# Patient Record
Sex: FEMALE | Race: NATIVE HAWAIIAN OR OTHER PACIFIC ISLANDER | NOT HISPANIC OR LATINO | URBAN - METROPOLITAN AREA
[De-identification: names, ages, dates, MRNs, and addresses within clinical notes are randomized per-mention and may not be internally consistent; named-entity substitution may affect disease eponyms.]

---

## 2024-04-08 ENCOUNTER — EMERGENCY (EMERGENCY)
Facility: HOSPITAL | Age: 64
LOS: 1 days | Discharge: HOME | End: 2024-04-08
Attending: STUDENT IN AN ORGANIZED HEALTH CARE EDUCATION/TRAINING PROGRAM
Payer: COMMERCIAL

## 2024-04-08 VITALS
HEART RATE: 85 BPM | DIASTOLIC BLOOD PRESSURE: 86 MMHG | RESPIRATION RATE: 20 BRPM | HEIGHT: 65 IN | OXYGEN SATURATION: 99 % | SYSTOLIC BLOOD PRESSURE: 143 MMHG | TEMPERATURE: 98 F | WEIGHT: 145.06 LBS

## 2024-04-08 PROCEDURE — 29125 APPL SHORT ARM SPLINT STATIC: CPT | Mod: LT

## 2024-04-08 PROCEDURE — 99285 EMERGENCY DEPT VISIT HI MDM: CPT | Mod: 25

## 2024-04-09 VITALS
OXYGEN SATURATION: 98 % | TEMPERATURE: 98 F | HEART RATE: 77 BPM | SYSTOLIC BLOOD PRESSURE: 109 MMHG | RESPIRATION RATE: 18 BRPM | DIASTOLIC BLOOD PRESSURE: 77 MMHG

## 2024-04-09 LAB
ALBUMIN SERPL ELPH-MCNC: 4.4 G/DL — SIGNIFICANT CHANGE UP (ref 3.3–5)
ALP SERPL-CCNC: 85 U/L — SIGNIFICANT CHANGE UP (ref 40–120)
ALT FLD-CCNC: 16 U/L — SIGNIFICANT CHANGE UP (ref 10–45)
ANION GAP SERPL CALC-SCNC: 13 MMOL/L — SIGNIFICANT CHANGE UP (ref 5–17)
AST SERPL-CCNC: 16 U/L — SIGNIFICANT CHANGE UP (ref 10–40)
BASOPHILS # BLD AUTO: 0.03 K/UL — SIGNIFICANT CHANGE UP (ref 0–0.2)
BASOPHILS NFR BLD AUTO: 0.3 % — SIGNIFICANT CHANGE UP (ref 0–2)
BILIRUB SERPL-MCNC: 0.2 MG/DL — SIGNIFICANT CHANGE UP (ref 0.2–1.2)
BUN SERPL-MCNC: 20 MG/DL — SIGNIFICANT CHANGE UP (ref 7–23)
CALCIUM SERPL-MCNC: 9.8 MG/DL — SIGNIFICANT CHANGE UP (ref 8.4–10.5)
CHLORIDE SERPL-SCNC: 105 MMOL/L — SIGNIFICANT CHANGE UP (ref 96–108)
CO2 SERPL-SCNC: 24 MMOL/L — SIGNIFICANT CHANGE UP (ref 22–31)
CREAT SERPL-MCNC: 0.65 MG/DL — SIGNIFICANT CHANGE UP (ref 0.5–1.3)
CRP SERPL-MCNC: 8 MG/L — HIGH (ref 0–4)
EGFR: 98 ML/MIN/1.73M2 — SIGNIFICANT CHANGE UP
EOSINOPHIL # BLD AUTO: 0.14 K/UL — SIGNIFICANT CHANGE UP (ref 0–0.5)
EOSINOPHIL NFR BLD AUTO: 1.4 % — SIGNIFICANT CHANGE UP (ref 0–6)
ERYTHROCYTE [SEDIMENTATION RATE] IN BLOOD: 29 MM/HR — HIGH (ref 0–20)
GLUCOSE SERPL-MCNC: 88 MG/DL — SIGNIFICANT CHANGE UP (ref 70–99)
HCT VFR BLD CALC: 36.9 % — SIGNIFICANT CHANGE UP (ref 34.5–45)
HGB BLD-MCNC: 12.3 G/DL — SIGNIFICANT CHANGE UP (ref 11.5–15.5)
IMM GRANULOCYTES NFR BLD AUTO: 0.2 % — SIGNIFICANT CHANGE UP (ref 0–0.9)
LYMPHOCYTES # BLD AUTO: 3.98 K/UL — HIGH (ref 1–3.3)
LYMPHOCYTES # BLD AUTO: 39.3 % — SIGNIFICANT CHANGE UP (ref 13–44)
MCHC RBC-ENTMCNC: 32.5 PG — SIGNIFICANT CHANGE UP (ref 27–34)
MCHC RBC-ENTMCNC: 33.3 GM/DL — SIGNIFICANT CHANGE UP (ref 32–36)
MCV RBC AUTO: 97.6 FL — SIGNIFICANT CHANGE UP (ref 80–100)
MONOCYTES # BLD AUTO: 0.76 K/UL — SIGNIFICANT CHANGE UP (ref 0–0.9)
MONOCYTES NFR BLD AUTO: 7.5 % — SIGNIFICANT CHANGE UP (ref 2–14)
NEUTROPHILS # BLD AUTO: 5.2 K/UL — SIGNIFICANT CHANGE UP (ref 1.8–7.4)
NEUTROPHILS NFR BLD AUTO: 51.3 % — SIGNIFICANT CHANGE UP (ref 43–77)
NRBC # BLD: 0 /100 WBCS — SIGNIFICANT CHANGE UP (ref 0–0)
PLATELET # BLD AUTO: 170 K/UL — SIGNIFICANT CHANGE UP (ref 150–400)
POTASSIUM SERPL-MCNC: 4.5 MMOL/L — SIGNIFICANT CHANGE UP (ref 3.5–5.3)
POTASSIUM SERPL-SCNC: 4.5 MMOL/L — SIGNIFICANT CHANGE UP (ref 3.5–5.3)
PROT SERPL-MCNC: 7.3 G/DL — SIGNIFICANT CHANGE UP (ref 6–8.3)
RBC # BLD: 3.78 M/UL — LOW (ref 3.8–5.2)
RBC # FLD: 12.9 % — SIGNIFICANT CHANGE UP (ref 10.3–14.5)
SODIUM SERPL-SCNC: 142 MMOL/L — SIGNIFICANT CHANGE UP (ref 135–145)
WBC # BLD: 10.13 K/UL — SIGNIFICANT CHANGE UP (ref 3.8–10.5)
WBC # FLD AUTO: 10.13 K/UL — SIGNIFICANT CHANGE UP (ref 3.8–10.5)

## 2024-04-09 PROCEDURE — 85025 COMPLETE CBC W/AUTO DIFF WBC: CPT

## 2024-04-09 PROCEDURE — 85652 RBC SED RATE AUTOMATED: CPT

## 2024-04-09 PROCEDURE — 96374 THER/PROPH/DIAG INJ IV PUSH: CPT | Mod: XU

## 2024-04-09 PROCEDURE — 86140 C-REACTIVE PROTEIN: CPT

## 2024-04-09 PROCEDURE — 73130 X-RAY EXAM OF HAND: CPT

## 2024-04-09 PROCEDURE — 73110 X-RAY EXAM OF WRIST: CPT | Mod: 26,LT

## 2024-04-09 PROCEDURE — 73201 CT UPPER EXTREMITY W/DYE: CPT | Mod: 26,LT,MC

## 2024-04-09 PROCEDURE — 73201 CT UPPER EXTREMITY W/DYE: CPT | Mod: MC

## 2024-04-09 PROCEDURE — 99284 EMERGENCY DEPT VISIT MOD MDM: CPT | Mod: 25

## 2024-04-09 PROCEDURE — 80053 COMPREHEN METABOLIC PANEL: CPT

## 2024-04-09 PROCEDURE — 73110 X-RAY EXAM OF WRIST: CPT

## 2024-04-09 PROCEDURE — 73130 X-RAY EXAM OF HAND: CPT | Mod: 26,LT

## 2024-04-09 PROCEDURE — 96375 TX/PRO/DX INJ NEW DRUG ADDON: CPT | Mod: XU

## 2024-04-09 RX ORDER — KETOROLAC TROMETHAMINE 30 MG/ML
15 SYRINGE (ML) INJECTION ONCE
Refills: 0 | Status: DISCONTINUED | OUTPATIENT
Start: 2024-04-09 | End: 2024-04-09

## 2024-04-09 RX ORDER — CEPHALEXIN 500 MG
1 CAPSULE ORAL
Qty: 20 | Refills: 0
Start: 2024-04-09 | End: 2024-04-13

## 2024-04-09 RX ORDER — VANCOMYCIN HCL 1 G
1000 VIAL (EA) INTRAVENOUS ONCE
Refills: 0 | Status: COMPLETED | OUTPATIENT
Start: 2024-04-09 | End: 2024-04-09

## 2024-04-09 RX ORDER — ACETAMINOPHEN 500 MG
1000 TABLET ORAL ONCE
Refills: 0 | Status: DISCONTINUED | OUTPATIENT
Start: 2024-04-09 | End: 2024-04-09

## 2024-04-09 RX ORDER — SODIUM CHLORIDE 9 MG/ML
1000 INJECTION INTRAMUSCULAR; INTRAVENOUS; SUBCUTANEOUS ONCE
Refills: 0 | Status: COMPLETED | OUTPATIENT
Start: 2024-04-09 | End: 2024-04-09

## 2024-04-09 RX ADMIN — Medication 15 MILLIGRAM(S): at 01:36

## 2024-04-09 RX ADMIN — Medication 250 MILLIGRAM(S): at 03:25

## 2024-04-09 RX ADMIN — SODIUM CHLORIDE 1000 MILLILITER(S): 9 INJECTION INTRAMUSCULAR; INTRAVENOUS; SUBCUTANEOUS at 01:54

## 2024-04-09 NOTE — ED ADULT NURSE NOTE - NSFALLUNIVINTERV_ED_ALL_ED
Bed/Stretcher in lowest position, wheels locked, appropriate side rails in place/Call bell, personal items and telephone in reach/Instruct patient to call for assistance before getting out of bed/chair/stretcher/Non-slip footwear applied when patient is off stretcher/[place] to call system/Physically safe environment - no spills, clutter or unnecessary equipment/Purposeful proactive rounding/Room/bathroom lighting operational, light cord in reach

## 2024-04-09 NOTE — ED ADULT NURSE NOTE - OBJECTIVE STATEMENT
62 y/O F with no pertinent medical history presenting to the ED for left hand pain/redness, atraumatic x 1-2 days.  Patient was otherwise in normal state of health.  Spontaneously noted progressive swelling over the dorsal aspect of her left hand w/ swelling.  Denies associated fever/chills.  Denies any puncture or traumatic wounds.  No history of gout.  Denies any history of immunosuppression medications.    Patient was seen at urgent care who initially had prescribed doxycycline and ibuprofen but ultimately recommended she come to the emergency department for further evaluation concerning for infectious process. AOx4, MAEx4, respirations even and unlabored, abd soft nontender/nondistended, skin warm dry and normal for race.     	Hx per son at bedside, offered  services but declining at this time.

## 2024-04-09 NOTE — ED PROVIDER NOTE - PHYSICAL EXAMINATION
GEN: Patient awake and alert. No acute distress, non-toxic. Uncomfortable secondary to pain.   Head: Normocephalic, atraumatic.  Neck: Nontender, full ROM.   Eyes: PERRLA b/l. EOMI, no scleral icterus, no conjunctival injection. Moist mucous membranes.  CARDIAC: RRR. Normal S1, S2. No murmur, rubs, or gallops. No peripheral edema noted.  PULM: Speaking in full sentences. CTA B/L no wheeze, rales or rhonchi. No signs of respiratory distress, no accessory muscle usage or nasal flaring.  ABD: Soft, nontender, nondistended. No rebound, no involuntary guarding. BS x 4, no auscultated bruit. No HSM appreciated.  : No CVA tenderness, no suprapubic tenderness.  MSK: Unable to actively range left wrist, erythematous over dorsal-radial aspect with marked TTP, no crepitus, no erythema linear streaking. Moving all other extremities spontaneously. Full ROM in extremities. No obvious bony deformity.  NEURO: A&Ox3, no focal neurological deficits, CN 2-12 grossly intact.   SKIN: Warm, dry, no rash,  no open wounds. No urticaria. No jaundice.

## 2024-04-09 NOTE — ED PROVIDER NOTE - OBJECTIVE STATEMENT
63-year-old female with no pertinent medical history presenting to the emergency department for acute onset x 1 to 2-day of left hand pain/redness, atraumatic.  Patient was otherwise in normal state of health.  Spontaneously noted progressive swelling over the dorsal aspect of her left hand w/ swelling.  Denies associated fever/chills.  Denies any puncture or traumatic wounds.  No history of gout.  Denies any history of immunosuppression medications.  No IVDU.  Patient was seen at urgent care who initially had prescribed doxycycline and ibuprofen but ultimately recommended she come to the emergency department for further evaluation concerning for infectious process.  Denies any purulent drainage or erythematous streaking up her arm.  No aquatic exposure.    Hx per son at bedside, offered  services but declining at this time.

## 2024-04-09 NOTE — ED ADULT NURSE REASSESSMENT NOTE - NS ED NURSE REASSESS COMMENT FT1
0700 Report received from CESAR Burgos  Pt AAOx4, NAD, resp nonlabored, skin warm/dry, resting comfortably in bed . Pt denies headache, dizziness, chest pain, palpitations, SOB, fevers, chills. Pt awaiting for results  . Safety maintained .

## 2024-04-09 NOTE — ED PROVIDER NOTE - NSFOLLOWUPINSTRUCTIONS_ED_ALL_ED_FT
You were seen in the emergency department for pain of the left wrist.  Your x-rays not show a fracture or dislocation.  The CT did show inflammatory changes in the area of your hand you have pain.  Given these findings you likely have inflammation of the tendon in that area.  We have contacted the hand surgeon who recommends you to use warm compresses and NSAIDs for pain control as well as taking an antibiotic which is sent to your pharmacy.  Please  this antibiotic as soon as possible and begin taking.      Please call Dr. Leon Resendez and schedule an appointment in the next 2-3 days for further management of your hand.  If you develop fevers, chills, worsening pain of your wrist, increased redness down your arm very elevated discharge left hand pain, or for any other questions or concerns please return to the emergency department.          Tenosynovitis  Hand and wrist showing the tendons and the tendon sheath.  Tenosynovitis is inflammation of a tendon and of the sleeve of tissue that covers the tendon (tendon sheath). A tendon is a cord of tissue that connects muscle to bone. Normally, a tendon slides smoothly inside its tendon sheath. Tenosynovitis limits movement of the tendon and surrounding tissues, which may cause pain, swelling, and stiffness.    Tenosynovitis can affect any tendon and tendon sheath. Commonly affected areas include tendons in the:  Wrist.  Arm.  Hand.  Hip.  Leg.  Foot.  Shoulder.  What are the causes?  The main cause of this condition is wear and tear over time that results in slight tears in the tendon. Other possible causes include:  An injury to the tendon or tendon sheath.  A disease that causes inflammation in the body.  An infection that spreads to the tendon and tendon sheath from a skin wound.  An infection in another part of the body that spreads to the tendon and tendon sheath through the blood.  What increases the risk?  The following factors may make you more likely to develop this condition:  Having rheumatoid arthritis, gout, or diabetes.  Using IV drugs.  Doing physical activities that can cause tendon overuse and stress.  Having gonorrhea.  What are the signs or symptoms?  Symptoms of this condition depend on the cause. Symptoms may include:  Pain with movement.  Pain when pressing on the tendon and tendon sheath.  Swelling.  Stiffness.  If tenosynovitis is caused by an infection, additional symptoms may include:  Fever.  Redness.  Warmth.  How is this diagnosed?  This condition may be diagnosed based on your medical history and a physical exam. You also may have:  Blood tests.  Imaging tests, such as:  MRI.  Ultrasound.  A sample of fluid removed from inside the tendon sheath to be checked in a lab.  How is this treated?  Treatment for this condition depends on the cause. If tenosynovitis is not caused by an infection, treatment may include:  Rest.  Keeping the tendon in place (immobilization) in a splint, brace, or sling.  Taking NSAIDs, such as ibuprofen, to reduce pain and swelling.  A shot (injection) of a steroid medicine to help reduce pain and swelling.  Icing or applying heat to the affected area.  Physical or occupational therapy.  Surgery to release the tendon in the sheath, to release the area that the tendon rests in, or to repair damage to the tendon or tendon sheath. Surgery may be done if other treatments do not help relieve symptoms.  If tenosynovitis is caused by infection, treatment may include antibiotic medicine given through an IV. In most cases, surgery may be needed to drain fluid from the tendon sheath, to clean and rinse out (irrigate) the tendon sheath, or to remove the tendon sheath.    Follow these instructions at home:  If you have a removable splint, brace, or sling:    A splint on a person's hand and wrist.   Wear the splint, brace, or sling as told by your health care provider. Remove it only as told by your health care provider.  Check the skin around the splint, brace, or sling every day. Tell your health care provider about any concerns.  Loosen the splint, brace, or sling if your fingers or toes tingle, become numb, or turn cold and blue.  Keep the splint, brace, or sling clean.  If the splint, brace, or sling is not waterproof:  Do not let it get wet.  Cover it with a watertight covering when you take a bath or shower.  Managing pain, stiffness, and swelling    Bag of ice on a towel on the skin.   If directed, put ice on the affected area. To do this:  If you have a removable splint, brace, or sling, remove it as told by your health care provider.  Put ice in a plastic bag.  Place a towel between your skin and the bag.  Leave the ice on for 20 minutes, 2–3 times a day.  Remove the ice if your skin turns bright red. This is very important. If you cannot feel pain, heat, or cold, you have a greater risk of damage to the area.  Move the fingers or toes of the affected limb often, if this applies. This can help to reduce stiffness and swelling.  If directed, raise (elevate) the affected area above the level of your heart while you are sitting or lying down.  If directed, apply heat to the affected area before you exercise or do therapy. Use the heat source that your health care provider recommends, such as a moist heat pack or a heating pad.  Place a towel between your skin and the heat source.  Leave the heat on for 20–30 minutes.  Remove the heat if your skin turns bright red. This is especially important if you are unable to feel pain, heat, or cold. You have a greater risk of getting burned.  Activity    Return to your normal activities as told by your health care provider. Ask your health care provider what activities are safe for you.  Rest the affected area as told by your health care provider.  Avoid using the affected area while you are having symptoms.  Do not use the injured limb to support your body weight until your health care provider says that you can.  If physical therapy was prescribed, do exercises as told by your health care provider.  General instructions    Take over-the-counter and prescription medicines only as told by your health care provider.  Ask your health care provider when it is safe to drive if you have a splint, brace, or sling on any part of your arm or leg.  Keep all follow-up visits. This is important.  Contact a health care provider if:  Your symptoms are not improving or are getting worse.  You have a fever and worsening of any of the following symptoms:  Pain.  Redness.  Warmth.  Swelling.  Get help right away if:  Your fingers or toes become numb or turn blue.  Summary  Tenosynovitis is inflammation of a tendon and of the sleeve of tissue that covers the tendon (tendon sheath).  Treatment for this condition depends on the cause. Treatment may include rest, medicines, physical therapy, or surgery.  Contact a health care provider if your symptoms are not improving or are getting worse.  Keep all follow-up visits.  This information is not intended to replace advice given to you by your health care provider. Make sure you discuss any questions you have with your health care provider. You were seen in the emergency department for pain of the left wrist.  Your x-rays not show a fracture or dislocation.  The CT did show inflammatory changes in the area of your hand you have pain.  Given these findings you likely have inflammation of the tendon in that area.  We have contacted the hand surgeon who recommends you to use warm compresses and NSAIDs for pain control as well as taking an antibiotic: Keflex which is sent to your pharmacy.  Please  this antibiotic 4 times per day for the next 5 days.     Please call Dr. Leon Resendez and schedule an appointment in the next 2-3 days for further management of your hand.  If you develop fevers, chills, worsening pain of your wrist, increased redness down your arm very elevated discharge left hand pain, or for any other questions or concerns please return to the emergency department.          Tenosynovitis  Hand and wrist showing the tendons and the tendon sheath.  Tenosynovitis is inflammation of a tendon and of the sleeve of tissue that covers the tendon (tendon sheath). A tendon is a cord of tissue that connects muscle to bone. Normally, a tendon slides smoothly inside its tendon sheath. Tenosynovitis limits movement of the tendon and surrounding tissues, which may cause pain, swelling, and stiffness.    Tenosynovitis can affect any tendon and tendon sheath. Commonly affected areas include tendons in the:  Wrist.  Arm.  Hand.  Hip.  Leg.  Foot.  Shoulder.  What are the causes?  The main cause of this condition is wear and tear over time that results in slight tears in the tendon. Other possible causes include:  An injury to the tendon or tendon sheath.  A disease that causes inflammation in the body.  An infection that spreads to the tendon and tendon sheath from a skin wound.  An infection in another part of the body that spreads to the tendon and tendon sheath through the blood.  What increases the risk?  The following factors may make you more likely to develop this condition:  Having rheumatoid arthritis, gout, or diabetes.  Using IV drugs.  Doing physical activities that can cause tendon overuse and stress.  Having gonorrhea.  What are the signs or symptoms?  Symptoms of this condition depend on the cause. Symptoms may include:  Pain with movement.  Pain when pressing on the tendon and tendon sheath.  Swelling.  Stiffness.  If tenosynovitis is caused by an infection, additional symptoms may include:  Fever.  Redness.  Warmth.  How is this diagnosed?  This condition may be diagnosed based on your medical history and a physical exam. You also may have:  Blood tests.  Imaging tests, such as:  MRI.  Ultrasound.  A sample of fluid removed from inside the tendon sheath to be checked in a lab.  How is this treated?  Treatment for this condition depends on the cause. If tenosynovitis is not caused by an infection, treatment may include:  Rest.  Keeping the tendon in place (immobilization) in a splint, brace, or sling.  Taking NSAIDs, such as ibuprofen, to reduce pain and swelling.  A shot (injection) of a steroid medicine to help reduce pain and swelling.  Icing or applying heat to the affected area.  Physical or occupational therapy.  Surgery to release the tendon in the sheath, to release the area that the tendon rests in, or to repair damage to the tendon or tendon sheath. Surgery may be done if other treatments do not help relieve symptoms.  If tenosynovitis is caused by infection, treatment may include antibiotic medicine given through an IV. In most cases, surgery may be needed to drain fluid from the tendon sheath, to clean and rinse out (irrigate) the tendon sheath, or to remove the tendon sheath.    Follow these instructions at home:  If you have a removable splint, brace, or sling:    A splint on a person's hand and wrist.   Wear the splint, brace, or sling as told by your health care provider. Remove it only as told by your health care provider.  Check the skin around the splint, brace, or sling every day. Tell your health care provider about any concerns.  Loosen the splint, brace, or sling if your fingers or toes tingle, become numb, or turn cold and blue.  Keep the splint, brace, or sling clean.  If the splint, brace, or sling is not waterproof:  Do not let it get wet.  Cover it with a watertight covering when you take a bath or shower.  Managing pain, stiffness, and swelling    Bag of ice on a towel on the skin.   If directed, put ice on the affected area. To do this:  If you have a removable splint, brace, or sling, remove it as told by your health care provider.  Put ice in a plastic bag.  Place a towel between your skin and the bag.  Leave the ice on for 20 minutes, 2–3 times a day.  Remove the ice if your skin turns bright red. This is very important. If you cannot feel pain, heat, or cold, you have a greater risk of damage to the area.  Move the fingers or toes of the affected limb often, if this applies. This can help to reduce stiffness and swelling.  If directed, raise (elevate) the affected area above the level of your heart while you are sitting or lying down.  If directed, apply heat to the affected area before you exercise or do therapy. Use the heat source that your health care provider recommends, such as a moist heat pack or a heating pad.  Place a towel between your skin and the heat source.  Leave the heat on for 20–30 minutes.  Remove the heat if your skin turns bright red. This is especially important if you are unable to feel pain, heat, or cold. You have a greater risk of getting burned.  Activity    Return to your normal activities as told by your health care provider. Ask your health care provider what activities are safe for you.  Rest the affected area as told by your health care provider.  Avoid using the affected area while you are having symptoms.  Do not use the injured limb to support your body weight until your health care provider says that you can.  If physical therapy was prescribed, do exercises as told by your health care provider.  General instructions    Take over-the-counter and prescription medicines only as told by your health care provider.  Ask your health care provider when it is safe to drive if you have a splint, brace, or sling on any part of your arm or leg.  Keep all follow-up visits. This is important.  Contact a health care provider if:  Your symptoms are not improving or are getting worse.  You have a fever and worsening of any of the following symptoms:  Pain.  Redness.  Warmth.  Swelling.  Get help right away if:  Your fingers or toes become numb or turn blue.  Summary  Tenosynovitis is inflammation of a tendon and of the sleeve of tissue that covers the tendon (tendon sheath).  Treatment for this condition depends on the cause. Treatment may include rest, medicines, physical therapy, or surgery.  Contact a health care provider if your symptoms are not improving or are getting worse.  Keep all follow-up visits.  This information is not intended to replace advice given to you by your health care provider. Make sure you discuss any questions you have with your health care provider.

## 2024-04-09 NOTE — ED PROVIDER NOTE - CLINICAL SUMMARY MEDICAL DECISION MAKING FREE TEXT BOX
Mauro Cao, DO: 62 yo F no pertinent hx presenting for ~24 hrs or increasing pain to proximal dorsal L hand and wrist. Reports severe pain, associated redness and swelling developing. Sent to ED by urgent care. Exam w/ pt appearing to be in pain. Decreased rom at L wrist and had 2/2 to pain. Exam not consistent w/ flexor compartment involvement. No sig effusion on exam. No recent trauma, but reports burn to hand in region days ago? Given pain concern for deep space infection. Consider cellulitis, early septic arthritis less likely, but will consider. No systemic symptoms of fever or N/V. Consider cellulitis, inflammatory/rheum, but no hx of this. No regular meds at home. Check labs, xrs, provide ivf, analgesia, will reassess. Will likely at least require obs for monitoring w/ concern for infection.

## 2024-04-09 NOTE — ED PROVIDER NOTE - PATIENT PORTAL LINK FT
You can access the FollowMyHealth Patient Portal offered by [facility] by registering at the following website: [email or web address]. By joining [facility]’s FollowMyHealth portal, you will also be able to view your health information using other applications (apps) compatible with our system.

## 2024-04-09 NOTE — ED PROVIDER NOTE - CARE PROVIDER_API CALL
Leon Resendez  Plastic Surgery  [address] [place] [address] [address]  [place] [address]  Phone: [phone]  Fax: [phone]  Follow Up Time: 1-3 Days

## 2024-04-09 NOTE — ED PROVIDER NOTE - PROGRESS NOTE DETAILS
Miah Hamilton MD PGY1: Received signout from out going resident. pt is a 63 y.o. F no significant PMHx, presenting with pain over the dorsal aspect L. hand proximal to the thumb.  Patient denies any trauma to the area.  Initially presented to urgent care, prescribed doxycycline and ibuprofen however given continued symptoms prompted to present to the ED.  X-ray of hand negative for acute fracture or dislocation.  CT significant for stranding along the dorsal aspect of left wrist may be 2/2 edema and/for cellulitis.  On physical exam, patient with pain out of proportion to exam able to move digits however unable to range at the wrist. Miah Hamilton MD PGY1: Consulted hand surgeon (Dr. Leon Resendez) for recommendations regarding this patient.  Given PE, CT findings, able vitals, Dr Resendez believes patient likely has tenosynovitis.  Recommendation at this time is for splinting.  Therapy at home to include warm compress, NSAIDs, and discharged on Keflex.  Patient to follow-up with Dr. Resendez within 2-3 days.  Informed patient and son (over the phone) of this update.  Both are in agreement and amenable to discharge at this time and will follow-up with hand surgeon.

## 2024-04-09 NOTE — ED PROVIDER NOTE - ATTENDING CONTRIBUTION TO CARE
negative...
I, Mauro Cao, have performed a history and physical exam on this patient, and discussed their management with the resident. I have fully participated in the care of this patient. I agree with the history, physical exam, and plan as documented by the resident

## 2024-04-10 ENCOUNTER — APPOINTMENT (OUTPATIENT)
Dept: ORTHOPEDIC SURGERY | Facility: CLINIC | Age: 64
End: 2024-04-10
Payer: COMMERCIAL

## 2024-04-10 DIAGNOSIS — L03.114 CELLULITIS OF LEFT UPPER LIMB: ICD-10-CM

## 2024-04-10 PROBLEM — Z78.9 OTHER SPECIFIED HEALTH STATUS: Chronic | Status: ACTIVE | Noted: 2024-04-09

## 2024-04-10 PROBLEM — Z00.00 ENCOUNTER FOR PREVENTIVE HEALTH EXAMINATION: Status: ACTIVE | Noted: 2024-04-10

## 2024-04-10 PROCEDURE — 99203 OFFICE O/P NEW LOW 30 MIN: CPT

## 2024-04-30 NOTE — ED PROCEDURE NOTE - ATTENDING CONTRIBUTION TO CARE
I, Mauro Cao, was present for the supervision of the described procedure, and provided assistance in its completion as needed.

## 2025-05-14 NOTE — ED ADULT NURSE NOTE - NS ED NURSE RECORD ANOTHER HT AND WT
Medication: tirzepatide (Mounjaro) 7.5 MG/0.5ML Solution Auto-injector   Last office visit date: 4/28/25  Upcoming appt: None  Medication Refill Protocol Failed.       Yes